# Patient Record
Sex: FEMALE | Race: WHITE | NOT HISPANIC OR LATINO | Employment: FULL TIME | URBAN - METROPOLITAN AREA
[De-identification: names, ages, dates, MRNs, and addresses within clinical notes are randomized per-mention and may not be internally consistent; named-entity substitution may affect disease eponyms.]

---

## 2021-07-23 ENCOUNTER — HISTORICAL (OUTPATIENT)
Dept: ADMINISTRATIVE | Facility: HOSPITAL | Age: 26
End: 2021-07-23

## 2021-07-23 LAB
ABS NEUT (OLG): 5 X10(3)/MCL (ref 2.1–9.2)
ALBUMIN SERPL-MCNC: 4.4 GM/DL (ref 3.4–5)
ALBUMIN/GLOB SERPL: 1.69 {RATIO} (ref 1.5–2.5)
ALP SERPL-CCNC: 46 UNIT/L (ref 38–126)
ALT SERPL-CCNC: 14 UNIT/L (ref 7–52)
APPEARANCE, UA: CLEAR
AST SERPL-CCNC: 15 UNIT/L (ref 15–37)
BACTERIA #/AREA URNS AUTO: ABNORMAL /HPF
BILIRUB SERPL-MCNC: 0.8 MG/DL (ref 0.2–1)
BILIRUB UR QL STRIP: NEGATIVE MG/DL
BILIRUBIN DIRECT+TOT PNL SERPL-MCNC: 0.2 MG/DL (ref 0–0.5)
BILIRUBIN DIRECT+TOT PNL SERPL-MCNC: 0.6 MG/DL
BUN SERPL-MCNC: 11 MG/DL (ref 7–18)
CALCIUM SERPL-MCNC: 9.3 MG/DL (ref 8.5–10)
CHLORIDE SERPL-SCNC: 105 MMOL/L (ref 98–107)
CHOLEST SERPL-MCNC: 125 MG/DL (ref 0–200)
CHOLEST/HDLC SERPL: 2.7 {RATIO}
CO2 SERPL-SCNC: 27 MMOL/L (ref 21–32)
COLOR UR: YELLOW
CREAT SERPL-MCNC: 0.74 MG/DL (ref 0.6–1.3)
ERYTHROCYTE [DISTWIDTH] IN BLOOD BY AUTOMATED COUNT: 11.9 % (ref 11.5–17)
GLOBULIN SER-MCNC: 2.6 GM/DL (ref 1.2–3)
GLUCOSE (UA): NEGATIVE MG/DL
GLUCOSE SERPL-MCNC: 93 MG/DL (ref 74–106)
HCT VFR BLD AUTO: 38.6 % (ref 37–47)
HDLC SERPL-MCNC: 46 MG/DL (ref 35–60)
HGB BLD-MCNC: 12.6 GM/DL (ref 12–16)
HGB UR QL STRIP: ABNORMAL UNIT/L
KETONES UR QL STRIP: NEGATIVE MG/DL
LDLC SERPL CALC-MCNC: 72 MG/DL (ref 0–129)
LEUKOCYTE ESTERASE UR QL STRIP: ABNORMAL UNIT/L
LYMPHOCYTES # BLD AUTO: 2.2 X10(3)/MCL (ref 0.6–3.4)
LYMPHOCYTES NFR BLD AUTO: 28.7 % (ref 13–40)
MCH RBC QN AUTO: 29.8 PG (ref 27–31.2)
MCHC RBC AUTO-ENTMCNC: 33 GM/DL (ref 32–36)
MCV RBC AUTO: 91 FL (ref 80–94)
MONOCYTES # BLD AUTO: 0.5 X10(3)/MCL (ref 0.1–1.3)
MONOCYTES NFR BLD AUTO: 6.6 % (ref 0.1–24)
NEUTROPHILS NFR BLD AUTO: 64.7 % (ref 47–80)
NITRITE UR QL STRIP.AUTO: NEGATIVE
PH UR STRIP: 7.5 [PH]
PLATELET # BLD AUTO: 347 X10(3)/MCL (ref 130–400)
PMV BLD AUTO: 10 FL (ref 9.4–12.4)
POTASSIUM SERPL-SCNC: 4.7 MMOL/L (ref 3.5–5.1)
PROT SERPL-MCNC: 7 GM/DL (ref 6.4–8.2)
PROT UR QL STRIP: NEGATIVE MG/DL
RBC # BLD AUTO: 4.23 X10(6)/MCL (ref 4.2–5.4)
RBC #/AREA URNS HPF: ABNORMAL /HPF
SODIUM SERPL-SCNC: 139 MMOL/L (ref 136–145)
SP GR UR STRIP: 1.02
SQUAMOUS EPITHELIAL, UA: ABNORMAL /LPF
TRIGL SERPL-MCNC: 68 MG/DL (ref 30–150)
TSH SERPL-ACNC: 1.31 MIU/ML (ref 0.35–4.94)
UROBILINOGEN UR STRIP-ACNC: 0.2 MG/DL
VLDLC SERPL CALC-MCNC: 13.6 MG/DL
WBC # SPEC AUTO: 7.7 X10(3)/MCL (ref 4.5–11.5)
WBC #/AREA URNS AUTO: ABNORMAL /[HPF]

## 2021-10-20 ENCOUNTER — HISTORICAL (OUTPATIENT)
Dept: ADMINISTRATIVE | Facility: HOSPITAL | Age: 26
End: 2021-10-20

## 2022-04-11 ENCOUNTER — HISTORICAL (OUTPATIENT)
Dept: ADMINISTRATIVE | Facility: HOSPITAL | Age: 27
End: 2022-04-11

## 2022-04-24 VITALS
DIASTOLIC BLOOD PRESSURE: 84 MMHG | BODY MASS INDEX: 29.9 KG/M2 | SYSTOLIC BLOOD PRESSURE: 132 MMHG | WEIGHT: 186.06 LBS | HEIGHT: 66 IN

## 2023-03-23 ENCOUNTER — LAB VISIT (OUTPATIENT)
Dept: LAB | Facility: HOSPITAL | Age: 28
End: 2023-03-23
Payer: COMMERCIAL

## 2023-03-23 DIAGNOSIS — N96 RECURRENT PREGNANCY LOSS: ICD-10-CM

## 2023-03-23 DIAGNOSIS — N92.6 IRREGULAR PERIODS/MENSTRUAL CYCLES: Primary | ICD-10-CM

## 2023-03-23 LAB — PROGEST SERPL-MCNC: 4.4 NG/ML

## 2023-03-23 PROCEDURE — 36415 COLL VENOUS BLD VENIPUNCTURE: CPT

## 2023-03-23 PROCEDURE — 84144 ASSAY OF PROGESTERONE: CPT

## 2023-03-25 ENCOUNTER — LAB VISIT (OUTPATIENT)
Dept: LAB | Facility: HOSPITAL | Age: 28
End: 2023-03-25
Attending: OBSTETRICS & GYNECOLOGY
Payer: COMMERCIAL

## 2023-03-25 DIAGNOSIS — N92.6 IRREGULAR PERIODS/MENSTRUAL CYCLES: Primary | ICD-10-CM

## 2023-03-25 DIAGNOSIS — N96 RECURRENT PREGNANCY LOSS: ICD-10-CM

## 2023-03-25 PROCEDURE — 36415 COLL VENOUS BLD VENIPUNCTURE: CPT

## 2023-03-25 PROCEDURE — 84144 ASSAY OF PROGESTERONE: CPT | Mod: 90

## 2023-03-27 LAB — PROGEST SERPL IA-MCNC: 9.6 NG/ML

## 2023-03-29 ENCOUNTER — LAB VISIT (OUTPATIENT)
Dept: LAB | Facility: HOSPITAL | Age: 28
End: 2023-03-29
Attending: OBSTETRICS & GYNECOLOGY
Payer: COMMERCIAL

## 2023-03-29 DIAGNOSIS — N96 RECURRENT PREGNANCY LOSS: Primary | ICD-10-CM

## 2023-03-29 LAB — PROGEST SERPL-MCNC: 8 NG/ML

## 2023-03-29 PROCEDURE — 84144 ASSAY OF PROGESTERONE: CPT

## 2023-03-29 PROCEDURE — 36415 COLL VENOUS BLD VENIPUNCTURE: CPT

## 2023-05-02 ENCOUNTER — LAB VISIT (OUTPATIENT)
Dept: LAB | Facility: HOSPITAL | Age: 28
End: 2023-05-02
Attending: OBSTETRICS & GYNECOLOGY
Payer: COMMERCIAL

## 2023-05-02 DIAGNOSIS — N94.10 PAIN IN FEMALE GENITALIA ON INTERCOURSE: Primary | ICD-10-CM

## 2023-05-02 DIAGNOSIS — N80.9 ENDOMETRIOTIC CYST: ICD-10-CM

## 2023-05-02 DIAGNOSIS — N96 HABITUAL ABORTER: ICD-10-CM

## 2023-05-02 LAB
ABORH RETYPE: NORMAL
ANION GAP SERPL CALC-SCNC: 5 MEQ/L
B-HCG FREE SERPL-ACNC: <2.42 MIU/ML
BUN SERPL-MCNC: 12.6 MG/DL (ref 7–18.7)
CALCIUM SERPL-MCNC: 9.5 MG/DL (ref 8.4–10.2)
CHLORIDE SERPL-SCNC: 106 MMOL/L (ref 98–107)
CO2 SERPL-SCNC: 26 MMOL/L (ref 22–29)
CREAT SERPL-MCNC: 0.78 MG/DL (ref 0.55–1.02)
CREAT/UREA NIT SERPL: 16
ERYTHROCYTE [DISTWIDTH] IN BLOOD BY AUTOMATED COUNT: 12.4 % (ref 11.5–17)
GFR SERPLBLD CREATININE-BSD FMLA CKD-EPI: >60 MLS/MIN/1.73/M2
GLUCOSE SERPL-MCNC: 89 MG/DL (ref 74–100)
GROUP & RH: NORMAL
HCT VFR BLD AUTO: 40.5 % (ref 37–47)
HGB BLD-MCNC: 13.1 G/DL (ref 12–16)
INDIRECT COOMBS GEL: NORMAL
MCH RBC QN AUTO: 30 PG (ref 27–31)
MCHC RBC AUTO-ENTMCNC: 32.3 G/DL (ref 33–36)
MCV RBC AUTO: 92.9 FL (ref 80–94)
NRBC BLD AUTO-RTO: 0 %
PLATELET # BLD AUTO: 343 X10(3)/MCL (ref 130–400)
PMV BLD AUTO: 10.5 FL (ref 7.4–10.4)
POTASSIUM SERPL-SCNC: 4.9 MMOL/L (ref 3.5–5.1)
RBC # BLD AUTO: 4.36 X10(6)/MCL (ref 4.2–5.4)
SODIUM SERPL-SCNC: 137 MMOL/L (ref 136–145)
SPECIMEN OUTDATE: NORMAL
WBC # SPEC AUTO: 8.94 X10(3)/MCL (ref 4.5–11.5)

## 2023-05-02 PROCEDURE — 85027 COMPLETE CBC AUTOMATED: CPT

## 2023-05-02 PROCEDURE — 84702 CHORIONIC GONADOTROPIN TEST: CPT

## 2023-05-02 PROCEDURE — 36415 COLL VENOUS BLD VENIPUNCTURE: CPT

## 2023-05-02 PROCEDURE — 86900 BLOOD TYPING SEROLOGIC ABO: CPT | Performed by: OBSTETRICS & GYNECOLOGY

## 2023-05-02 PROCEDURE — 80048 BASIC METABOLIC PNL TOTAL CA: CPT

## 2023-05-02 NOTE — H&P (VIEW-ONLY)
"  Chief Complaint:  pelvic pain, suspected endometriosis, RPL, surgical counseling    History of Present Illness    Donna Mora is a 28 y.o.      Pt has been hopin to conceive since marriage in 2022.  Pt has had 2  miscarriage in this time. ( Last pap 2022 NILM)            reporst 6/10 pelvic pain on CD#1 and #2 and also intermittently has deep pain with IC and associated post-coital aching.         Past Medical History:   Diagnosis Date    Endometriosis, unspecified     GERD (gastroesophageal reflux disease)        Past Surgical History:   Procedure Laterality Date    WISDOM TOOTH EXTRACTION         Family History   Problem Relation Age of Onset    Ovarian cancer Mother     Hyperlipidemia Father     Hypertension Father        Social History     Tobacco Use   Smoking Status Never   Smokeless Tobacco Never       Social History     Substance and Sexual Activity   Sexual Activity Yes    Partners: Male        OB History    Para Term  AB Living   2       2     SAB IAB Ectopic Multiple Live Births   2              # Outcome Date GA Lbr Hany/2nd Weight Sex Delivery Anes PTL Lv   2 SAB 2023 5w0d    SAB   FD   1 2022 6w0d    SAB   FD       Review of Systems  GENERAL: Denies unintentional weight gain or weight loss. Feeling well overall.   SKIN: Denies rash or lesions.   HEENT: Denies headaches, or vision changes.   CARDIOVASCULAR: Denies palpitations or chest pain.   RESPIRATORY: Denies shortness of breath or dyspnea on exertion.  BREASTS: Denies pain, lumps, or nipple discharge.   ABDOMEN: Denies abdominal pain, constipation, diarrhea, nausea, vomiting, change in appetite.  URINARY: Denies frequency, dysuria, hematuria.  NEUROLOGIC: Denies syncope or weakness.   PSYCHIATRIC: Denies depression, anxiety or mood swings.     Objective:     /84   Pulse 93   Ht 5' 5" (1.651 m)   Wt 88.6 kg (195 lb 5.2 oz)   LMP 2023   BMI 32.50 kg/m²     Body mass " index is 32.5 kg/m².    APPEARANCE: Well nourished, well developed, in no acute distress.  PSYCH: Appropriate mood and affect.  SKIN: No acne or hirsutism    NODES: No inguinal, axillary, or supraclavicular lymph node enlargement  CHEST: Normal respiratory effort  CV: Normal cap refill.    ABDOMEN: Soft.  No tenderness or masses.    EXTREMITIES: No edema           Assessment/ Plan:     1. Dysmenorrhea        2. Female dyspareunia        3. Recurrent pregnancy loss        4. Endometriosis            1. To OR for Robot-assisted Laparosocpy and anticipated resection of endometriosis, Dx H-scope D&C this week.    2. After detailed review of surgery and potential risks, consent signed.      Counseling     With the patient I had a full discussion of the risks, benefits, and alternatives of all procedures to be performed, including but not limited to injury to other organs, failure to resolve problem, recurrence of disease state, and infection. We discussed the risks, benefits, and alternatives as well.  She agrees with the plan of care; therefore, we will proceed with the surgery as scheduled.

## 2023-05-03 ENCOUNTER — ANESTHESIA EVENT (OUTPATIENT)
Dept: SURGERY | Facility: HOSPITAL | Age: 28
End: 2023-05-03
Payer: COMMERCIAL

## 2023-05-04 ENCOUNTER — HOSPITAL ENCOUNTER (OUTPATIENT)
Facility: HOSPITAL | Age: 28
Discharge: HOME | End: 2023-05-04
Attending: OBSTETRICS & GYNECOLOGY | Admitting: OBSTETRICS & GYNECOLOGY
Payer: COMMERCIAL

## 2023-05-04 ENCOUNTER — ANESTHESIA (OUTPATIENT)
Dept: SURGERY | Facility: HOSPITAL | Age: 28
End: 2023-05-04
Payer: COMMERCIAL

## 2023-05-04 VITALS
BODY MASS INDEX: 32.15 KG/M2 | DIASTOLIC BLOOD PRESSURE: 75 MMHG | OXYGEN SATURATION: 99 % | RESPIRATION RATE: 18 BRPM | HEART RATE: 90 BPM | TEMPERATURE: 98 F | WEIGHT: 193 LBS | HEIGHT: 65 IN | SYSTOLIC BLOOD PRESSURE: 112 MMHG

## 2023-05-04 DIAGNOSIS — R10.2 FEMALE PELVIC PAIN: Primary | ICD-10-CM

## 2023-05-04 DIAGNOSIS — N94.10 PAIN IN FEMALE GENITALIA ON INTERCOURSE: ICD-10-CM

## 2023-05-04 DIAGNOSIS — N96 HABITUAL ABORTER: ICD-10-CM

## 2023-05-04 DIAGNOSIS — N80.9 ENDOMETRIOTIC CYST: ICD-10-CM

## 2023-05-04 LAB — B-HCG FREE SERPL-ACNC: <2.42 MIU/ML

## 2023-05-04 PROCEDURE — 63600175 PHARM REV CODE 636 W HCPCS: Performed by: ANESTHESIOLOGY

## 2023-05-04 PROCEDURE — 71000015 HC POSTOP RECOV 1ST HR: Performed by: OBSTETRICS & GYNECOLOGY

## 2023-05-04 PROCEDURE — 71000033 HC RECOVERY, INTIAL HOUR: Performed by: OBSTETRICS & GYNECOLOGY

## 2023-05-04 PROCEDURE — 25000003 PHARM REV CODE 250: Performed by: NURSE ANESTHETIST, CERTIFIED REGISTERED

## 2023-05-04 PROCEDURE — D9220A PRA ANESTHESIA: Mod: ANES,,, | Performed by: ANESTHESIOLOGY

## 2023-05-04 PROCEDURE — 37000008 HC ANESTHESIA 1ST 15 MINUTES: Performed by: OBSTETRICS & GYNECOLOGY

## 2023-05-04 PROCEDURE — 63600175 PHARM REV CODE 636 W HCPCS: Performed by: OBSTETRICS & GYNECOLOGY

## 2023-05-04 PROCEDURE — D9220A PRA ANESTHESIA: ICD-10-PCS | Mod: ANES,,, | Performed by: ANESTHESIOLOGY

## 2023-05-04 PROCEDURE — 25000003 PHARM REV CODE 250: Performed by: ANESTHESIOLOGY

## 2023-05-04 PROCEDURE — 84702 CHORIONIC GONADOTROPIN TEST: CPT | Performed by: OBSTETRICS & GYNECOLOGY

## 2023-05-04 PROCEDURE — 63600175 PHARM REV CODE 636 W HCPCS: Performed by: NURSE ANESTHETIST, CERTIFIED REGISTERED

## 2023-05-04 PROCEDURE — 71000016 HC POSTOP RECOV ADDL HR: Performed by: OBSTETRICS & GYNECOLOGY

## 2023-05-04 PROCEDURE — 36000711: Performed by: OBSTETRICS & GYNECOLOGY

## 2023-05-04 PROCEDURE — 25000003 PHARM REV CODE 250: Performed by: OBSTETRICS & GYNECOLOGY

## 2023-05-04 PROCEDURE — 36000710: Performed by: OBSTETRICS & GYNECOLOGY

## 2023-05-04 PROCEDURE — D9220A PRA ANESTHESIA: Mod: CRNA,,, | Performed by: NURSE ANESTHETIST, CERTIFIED REGISTERED

## 2023-05-04 PROCEDURE — D9220A PRA ANESTHESIA: ICD-10-PCS | Mod: CRNA,,, | Performed by: NURSE ANESTHETIST, CERTIFIED REGISTERED

## 2023-05-04 PROCEDURE — 37000009 HC ANESTHESIA EA ADD 15 MINS: Performed by: OBSTETRICS & GYNECOLOGY

## 2023-05-04 PROCEDURE — 27201423 OPTIME MED/SURG SUP & DEVICES STERILE SUPPLY: Performed by: OBSTETRICS & GYNECOLOGY

## 2023-05-04 RX ORDER — HYDROMORPHONE HYDROCHLORIDE 2 MG/ML
INJECTION, SOLUTION INTRAMUSCULAR; INTRAVENOUS; SUBCUTANEOUS
Status: DISCONTINUED | OUTPATIENT
Start: 2023-05-04 | End: 2023-05-04

## 2023-05-04 RX ORDER — PROPOFOL 10 MG/ML
VIAL (ML) INTRAVENOUS
Status: DISCONTINUED | OUTPATIENT
Start: 2023-05-04 | End: 2023-05-04

## 2023-05-04 RX ORDER — ONDANSETRON 2 MG/ML
4 INJECTION INTRAMUSCULAR; INTRAVENOUS DAILY PRN
Status: DISCONTINUED | OUTPATIENT
Start: 2023-05-04 | End: 2023-05-04 | Stop reason: HOSPADM

## 2023-05-04 RX ORDER — ONDANSETRON 4 MG/1
4 TABLET, ORALLY DISINTEGRATING ORAL ONCE
Status: COMPLETED | OUTPATIENT
Start: 2023-05-04 | End: 2023-05-04

## 2023-05-04 RX ORDER — MIDAZOLAM HYDROCHLORIDE 1 MG/ML
2 INJECTION INTRAMUSCULAR; INTRAVENOUS ONCE AS NEEDED
Status: DISCONTINUED | OUTPATIENT
Start: 2023-05-04 | End: 2023-05-04 | Stop reason: HOSPADM

## 2023-05-04 RX ORDER — HYDROMORPHONE HYDROCHLORIDE 2 MG/ML
0.4 INJECTION, SOLUTION INTRAMUSCULAR; INTRAVENOUS; SUBCUTANEOUS EVERY 5 MIN PRN
Status: DISCONTINUED | OUTPATIENT
Start: 2023-05-04 | End: 2023-05-04 | Stop reason: HOSPADM

## 2023-05-04 RX ORDER — HYDROCODONE BITARTRATE AND ACETAMINOPHEN 5; 325 MG/1; MG/1
1 TABLET ORAL EVERY 4 HOURS PRN
Status: DISCONTINUED | OUTPATIENT
Start: 2023-05-04 | End: 2023-05-04 | Stop reason: HOSPADM

## 2023-05-04 RX ORDER — HYDROMORPHONE HYDROCHLORIDE 2 MG/ML
1 INJECTION, SOLUTION INTRAMUSCULAR; INTRAVENOUS; SUBCUTANEOUS EVERY 4 HOURS PRN
Status: DISCONTINUED | OUTPATIENT
Start: 2023-05-04 | End: 2023-05-04 | Stop reason: HOSPADM

## 2023-05-04 RX ORDER — LIDOCAINE HYDROCHLORIDE 10 MG/ML
1 INJECTION, SOLUTION EPIDURAL; INFILTRATION; INTRACAUDAL; PERINEURAL ONCE
Status: DISCONTINUED | OUTPATIENT
Start: 2023-05-04 | End: 2023-05-04 | Stop reason: HOSPADM

## 2023-05-04 RX ORDER — OXYCODONE AND ACETAMINOPHEN 5; 325 MG/1; MG/1
1 TABLET ORAL EVERY 4 HOURS PRN
Qty: 28 TABLET | Refills: 0 | Status: SHIPPED | OUTPATIENT
Start: 2023-05-04

## 2023-05-04 RX ORDER — CEFAZOLIN SODIUM 2 G/50ML
2 SOLUTION INTRAVENOUS
Status: DISCONTINUED | OUTPATIENT
Start: 2023-05-04 | End: 2023-05-04 | Stop reason: HOSPADM

## 2023-05-04 RX ORDER — PROCHLORPERAZINE EDISYLATE 5 MG/ML
5 INJECTION INTRAMUSCULAR; INTRAVENOUS EVERY 6 HOURS PRN
Status: DISCONTINUED | OUTPATIENT
Start: 2023-05-04 | End: 2023-05-04 | Stop reason: HOSPADM

## 2023-05-04 RX ORDER — IBUPROFEN 600 MG/1
600 TABLET ORAL EVERY 6 HOURS PRN
Status: DISCONTINUED | OUTPATIENT
Start: 2023-05-04 | End: 2023-05-04 | Stop reason: HOSPADM

## 2023-05-04 RX ORDER — GLYCOPYRROLATE 0.2 MG/ML
INJECTION INTRAMUSCULAR; INTRAVENOUS
Status: DISCONTINUED | OUTPATIENT
Start: 2023-05-04 | End: 2023-05-04

## 2023-05-04 RX ORDER — ACETAMINOPHEN 325 MG/1
650 TABLET ORAL EVERY 4 HOURS PRN
Status: DISCONTINUED | OUTPATIENT
Start: 2023-05-04 | End: 2023-05-04 | Stop reason: HOSPADM

## 2023-05-04 RX ORDER — ONDANSETRON 4 MG/1
8 TABLET, ORALLY DISINTEGRATING ORAL EVERY 8 HOURS PRN
Status: DISCONTINUED | OUTPATIENT
Start: 2023-05-04 | End: 2023-05-04 | Stop reason: HOSPADM

## 2023-05-04 RX ORDER — DIPHENHYDRAMINE HYDROCHLORIDE 50 MG/ML
25 INJECTION INTRAMUSCULAR; INTRAVENOUS ONCE
Status: DISCONTINUED | OUTPATIENT
Start: 2023-05-04 | End: 2023-05-04 | Stop reason: HOSPADM

## 2023-05-04 RX ORDER — SODIUM CHLORIDE, SODIUM LACTATE, POTASSIUM CHLORIDE, CALCIUM CHLORIDE 600; 310; 30; 20 MG/100ML; MG/100ML; MG/100ML; MG/100ML
INJECTION, SOLUTION INTRAVENOUS CONTINUOUS
Status: DISCONTINUED | OUTPATIENT
Start: 2023-05-04 | End: 2023-05-04 | Stop reason: HOSPADM

## 2023-05-04 RX ORDER — ROCURONIUM BROMIDE 10 MG/ML
INJECTION, SOLUTION INTRAVENOUS
Status: DISCONTINUED | OUTPATIENT
Start: 2023-05-04 | End: 2023-05-04

## 2023-05-04 RX ORDER — MIDAZOLAM HYDROCHLORIDE 1 MG/ML
INJECTION INTRAMUSCULAR; INTRAVENOUS
Status: DISCONTINUED | OUTPATIENT
Start: 2023-05-04 | End: 2023-05-04

## 2023-05-04 RX ORDER — MEPERIDINE HYDROCHLORIDE 25 MG/ML
12.5 INJECTION INTRAMUSCULAR; INTRAVENOUS; SUBCUTANEOUS ONCE
Status: DISCONTINUED | OUTPATIENT
Start: 2023-05-04 | End: 2023-05-04 | Stop reason: HOSPADM

## 2023-05-04 RX ORDER — SODIUM CHLORIDE 9 MG/ML
INJECTION, SOLUTION INTRAVENOUS CONTINUOUS
Status: DISCONTINUED | OUTPATIENT
Start: 2023-05-04 | End: 2023-05-04 | Stop reason: HOSPADM

## 2023-05-04 RX ORDER — SODIUM CHLORIDE, SODIUM GLUCONATE, SODIUM ACETATE, POTASSIUM CHLORIDE AND MAGNESIUM CHLORIDE 30; 37; 368; 526; 502 MG/100ML; MG/100ML; MG/100ML; MG/100ML; MG/100ML
INJECTION, SOLUTION INTRAVENOUS CONTINUOUS
Status: DISCONTINUED | OUTPATIENT
Start: 2023-05-04 | End: 2023-05-04 | Stop reason: HOSPADM

## 2023-05-04 RX ORDER — CEFAZOLIN SODIUM 1 G/3ML
INJECTION, POWDER, FOR SOLUTION INTRAMUSCULAR; INTRAVENOUS
Status: DISCONTINUED | OUTPATIENT
Start: 2023-05-04 | End: 2023-05-04

## 2023-05-04 RX ORDER — FAMOTIDINE 20 MG/1
20 TABLET, FILM COATED ORAL
Status: COMPLETED | OUTPATIENT
Start: 2023-05-04 | End: 2023-05-04

## 2023-05-04 RX ORDER — BUPIVACAINE HYDROCHLORIDE AND EPINEPHRINE 5; 5 MG/ML; UG/ML
INJECTION, SOLUTION EPIDURAL; INTRACAUDAL; PERINEURAL
Status: DISCONTINUED | OUTPATIENT
Start: 2023-05-04 | End: 2023-05-04 | Stop reason: HOSPADM

## 2023-05-04 RX ORDER — DEXAMETHASONE SODIUM PHOSPHATE 4 MG/ML
INJECTION, SOLUTION INTRA-ARTICULAR; INTRALESIONAL; INTRAMUSCULAR; INTRAVENOUS; SOFT TISSUE
Status: DISCONTINUED | OUTPATIENT
Start: 2023-05-04 | End: 2023-05-04

## 2023-05-04 RX ADMIN — ROCURONIUM BROMIDE 50 MG: 10 SOLUTION INTRAVENOUS at 12:05

## 2023-05-04 RX ADMIN — FAMOTIDINE 20 MG: 20 TABLET, FILM COATED ORAL at 09:05

## 2023-05-04 RX ADMIN — ONDANSETRON 4 MG: 4 TABLET, ORALLY DISINTEGRATING ORAL at 09:05

## 2023-05-04 RX ADMIN — PROCHLORPERAZINE EDISYLATE 5 MG: 5 INJECTION INTRAMUSCULAR; INTRAVENOUS at 03:05

## 2023-05-04 RX ADMIN — PROPOFOL 150 MG: 10 INJECTION, EMULSION INTRAVENOUS at 12:05

## 2023-05-04 RX ADMIN — SUGAMMADEX 200 MG: 100 INJECTION, SOLUTION INTRAVENOUS at 02:05

## 2023-05-04 RX ADMIN — CEFAZOLIN 2 G: 330 INJECTION, POWDER, FOR SOLUTION INTRAMUSCULAR; INTRAVENOUS at 12:05

## 2023-05-04 RX ADMIN — ONDANSETRON 4 MG: 2 INJECTION INTRAMUSCULAR; INTRAVENOUS at 02:05

## 2023-05-04 RX ADMIN — DEXAMETHASONE SODIUM PHOSPHATE 12 MG: 4 INJECTION, SOLUTION INTRA-ARTICULAR; INTRALESIONAL; INTRAMUSCULAR; INTRAVENOUS; SOFT TISSUE at 12:05

## 2023-05-04 RX ADMIN — SODIUM CHLORIDE, SODIUM GLUCONATE, SODIUM ACETATE, POTASSIUM CHLORIDE AND MAGNESIUM CHLORIDE: 526; 502; 368; 37; 30 INJECTION, SOLUTION INTRAVENOUS at 12:05

## 2023-05-04 RX ADMIN — HYDROMORPHONE HYDROCHLORIDE 0.5 MG: 2 INJECTION, SOLUTION INTRAMUSCULAR; INTRAVENOUS; SUBCUTANEOUS at 12:05

## 2023-05-04 RX ADMIN — GLYCOPYRROLATE 0.2 MG: 0.2 INJECTION INTRAMUSCULAR; INTRAVENOUS at 12:05

## 2023-05-04 RX ADMIN — SODIUM CHLORIDE, SODIUM GLUCONATE, SODIUM ACETATE, POTASSIUM CHLORIDE AND MAGNESIUM CHLORIDE: 526; 502; 368; 37; 30 INJECTION, SOLUTION INTRAVENOUS at 01:05

## 2023-05-04 RX ADMIN — PROPOFOL 50 MG: 10 INJECTION, EMULSION INTRAVENOUS at 02:05

## 2023-05-04 RX ADMIN — HYDROMORPHONE HYDROCHLORIDE 1 MG: 2 INJECTION, SOLUTION INTRAMUSCULAR; INTRAVENOUS; SUBCUTANEOUS at 12:05

## 2023-05-04 RX ADMIN — MIDAZOLAM HYDROCHLORIDE 2 MG: 1 INJECTION, SOLUTION INTRAMUSCULAR; INTRAVENOUS at 12:05

## 2023-05-04 RX ADMIN — ACETAMINOPHEN 325MG 650 MG: 325 TABLET ORAL at 09:05

## 2023-05-04 RX ADMIN — HYDROMORPHONE HYDROCHLORIDE 0.5 MG: 2 INJECTION, SOLUTION INTRAMUSCULAR; INTRAVENOUS; SUBCUTANEOUS at 02:05

## 2023-05-04 NOTE — DISCHARGE INSTRUCTIONS
-NO driving for 24 hours and while taking narcotic pain medication.    -Keep sites clean and dry for 48 hours. OK to shower afterwards. Do not tub bathe or submerge incision under water.    -Pelvic rest: no heavy lifting, no tampons, no intercourse.    -You did receive suggammadex, please see attached document concerning birthcontrol and suggammadex.    -Monitor sites for infection: redness, swelling, fever, chills, drainage/pus/foul odor.    -Minimal bleeding is expected. Notify your provider if soaking through pad < 1hour.    -Report to your nearest ER/Notify provider if you experience any SUDDEN/SEVERE chest pain, abdominal pain, weakness, trouble breathing, uncontrolled pain/bleeding.

## 2023-05-04 NOTE — TRANSFER OF CARE
"Anesthesia Transfer of Care Note    Patient: Donna Mora    Procedure(s) Performed: Procedure(s) (LRB):  ROBOTIC ABLATION OR EXCISION,ENDOMETRIOSIS (N/A)  DILATION AND CURETTAGE, UTERUS (N/A)  HYSTEROSCOPY    Patient location: PACU    Anesthesia Type: general    Transport from OR: Transported from OR on room air with adequate spontaneous ventilation    Post pain: adequate analgesia    Post assessment: no apparent anesthetic complications    Post vital signs: stable    Level of consciousness: awake and alert    Nausea/Vomiting: no nausea/vomiting    Complications: none    Transfer of care protocol was followed      Last vitals:   Visit Vitals  /80   Pulse 86   Temp 37.3 °C (99.1 °F)   Resp 16   Ht 5' 5" (1.651 m)   Wt 87.5 kg (193 lb)   LMP 04/04/2023   SpO2 99%   Breastfeeding No   BMI 32.12 kg/m²     "

## 2023-05-04 NOTE — ANESTHESIA POSTPROCEDURE EVALUATION
Anesthesia Post Evaluation    Patient: Donna Mora    Procedure(s) Performed: Procedure(s) (LRB):  ROBOTIC ABLATION OR EXCISION,ENDOMETRIOSIS (N/A)  DILATION AND CURETTAGE, UTERUS (N/A)  HYSTEROSCOPY    Final Anesthesia Type: general      Patient location during evaluation: PACU  Patient participation: Yes- Able to Participate  Level of consciousness: awake and alert, awake and oriented  Post-procedure vital signs: reviewed and stable  Pain management: adequate  Airway patency: patent    PONV status at discharge: No PONV  Anesthetic complications: no      Cardiovascular status: blood pressure returned to baseline, hemodynamically stable and stable  Respiratory status: unassisted, spontaneous ventilation and room air  Hydration status: euvolemic  Follow-up not needed.          Vitals Value Taken Time   /78 05/04/23 1502   Temp 36.4 °C (97.6 °F) 05/04/23 1440   Pulse 88 05/04/23 1509   Resp 16 05/04/23 1509   SpO2 98 % 05/04/23 1509   Vitals shown include unvalidated device data.      No case tracking events are documented in the log.      Pain/Bruce Score: Pain Rating Prior to Med Admin: 0 (5/4/2023  9:31 AM)  Bruce Score: 10 (5/4/2023  3:00 PM)

## 2023-05-04 NOTE — ANESTHESIA PREPROCEDURE EVALUATION
05/04/2023  Donna Mora is a 28 y.o., female.    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]    Procedure(s):  ROBOTIC ABLATION OR EXCISION,ENDOMETRIOSIS  DILATION AND CURETTAGE, UTERUS     Review of patient's allergies indicates:  No Known Allergies    Current Outpatient Medications   Medication Instructions    progesterone (PROMETRIUM) 200 MG capsule Place one capsule vaginal nightly on cycle days Peak +3 through Peak +12 each month       KY LAP,FULGURATE/EXCISE LESIONS [85001] (ROBOTIC ABLATION O*    Past Medical History:   Diagnosis Date    Endometriosis, unspecified     GERD (gastroesophageal reflux disease)        Past Surgical History:   Procedure Laterality Date    WISDOM TOOTH EXTRACTION       Lab Results   Component Value Date    WBC 8.94 05/02/2023    HGB 13.1 05/02/2023    HCT 40.5 05/02/2023    MCV 92.9 05/02/2023     05/02/2023   BMP  Lab Results   Component Value Date     05/02/2023    K 4.9 05/02/2023    CO2 26 05/02/2023    BUN 12.6 05/02/2023    CREATININE 0.78 05/02/2023    CALCIUM 9.5 05/02/2023    EGFRNONAA >60 07/23/2021            Pre-op Assessment    I have reviewed the Patient Summary Reports.     I have reviewed the Nursing Notes. I have reviewed the NPO Status.   I have reviewed the Medications.     Review of Systems  Anesthesia Hx:  No problems with previous Anesthesia    Hematology/Oncology:  Hematology Normal   Oncology Normal     EENT/Dental:EENT/Dental Normal   Cardiovascular:  Cardiovascular Normal Exercise tolerance: good   Functional Capacity good / => 4 METS    Pulmonary:  Pulmonary Normal    Renal/:   Denies Chronic Renal Disease.     Hepatic/GI:  Hepatic/GI Normal    Musculoskeletal:  Musculoskeletal Normal    Neurological:  Neurology Normal     Endocrine:  Endocrine Normal  Denies Morbid Obesity / BMI > 40  Dermatological:  Skin Normal    Psych:  Psychiatric Normal           Physical Exam  General: Alert, Oriented, Well nourished and Cooperative    Airway:  Mallampati: II   Mouth Opening: Normal  TM Distance: Normal  Tongue: Normal  Neck ROM: Normal ROM    Dental:  Intact    Chest/Lungs:  Clear to auscultation, Normal Respiratory Rate    Heart:  Rate: Normal  Rhythm: Regular Rhythm          Anesthesia Plan  Type of Anesthesia, risks & benefits discussed:    Anesthesia Type: Gen ETT  Intra-op Monitoring Plan: Standard ASA Monitors  Post Op Pain Control Plan: multimodal analgesia  Induction:  IV and Inhalation  Airway Plan: Direct  Informed Consent: Informed consent signed with the Patient and all parties understand the risks and agree with anesthesia plan.  All questions answered. Patient consented to blood products? Yes  ASA Score: 2  Day of Surgery Review of History & Physical: H&P Update referred to the surgeon/provider.I have interviewed and examined the patient. I have reviewed the patient's H&P dated: There are no significant changes.     Ready For Surgery From Anesthesia Perspective.     .

## 2023-05-04 NOTE — ANESTHESIA PROCEDURE NOTES
Intubation    Date/Time: 5/4/2023 12:29 PM  Performed by: Venessa Whittaker CRNA  Authorized by: Hakan Mueller MD     Intubation:     Induction:  Intravenous    Intubated:  Postinduction    Mask Ventilation:  Easy mask    Attempts:  1    Attempted By:  ENT (ENT resident)    Method of Intubation:  Direct    Blade:  Bernal 2    Laryngeal View Grade: Grade I - full view of cords      Difficult Airway Encountered?: No      Complications:  None    Airway Device:  Oral endotracheal tube    Airway Device Size:  7.0    Style/Cuff Inflation:  Cuffed (inflated to minimal occlusive pressure)    Inflation Amount (mL):  5    Tube secured:  21.5    Secured at:  The lips    Placement Verified By:  Capnometry    Complicating Factors:  None    Findings Post-Intubation:  BS equal bilateral and atraumatic/condition of teeth unchanged

## 2023-05-05 LAB — PSYCHE PATHOLOGY RESULT: NORMAL

## 2023-05-05 NOTE — OP NOTE
[facility] - Periop Services  OBGYN  Operative Note    SUMMARY     Date of Procedure: 5/4/2023     Procedure:   Procedure(s) (LRB):  ROBOTIC ABLATION OR EXCISION,ENDOMETRIOSIS (N/A)  DILATION AND CURETTAGE, UTERUS (N/A)  HYSTEROSCOPY       Surgeon: Miki No M.D.    Assistant: n/a    Pre-Operative Diagnosis:   Suspected endometriosis   2.   Pelvic pain  3.   AUB    Post-Operative Diagnosis:     Stage I endometriosis   2.   Pelvic pain  3.   AUB      Anesthesia: General    Technical Procedures Used:   1. Robot-assist laparoscopic excision of endometriosis  2. Diagnostic hysteroscopy with dilation and curretage    Complications: none    Estimated Blood Loss (EBL): 50ml       Indications: Pelvic pain c/w endometriosis, AUB    Findings:  Diagnostic laparoscopy revealed adhesions involving the sigmoid colon and left abdominal wall.  Additionally there were suspected endometriosis lesions involving the left uterosacral ligament, the left ovarian fossae, the posterior cul-de-sac, and the surface of each ovary.    Upon laparosocpy, a small posterior fundal defect from suspected perforation with uterine sound was noted and observed to be hemostatic.  Hysteroscopy evaluation was also consistent with a false track inferior to the endocervical canal.  After redirecting the hysteroscope into the uterine cavity, bilateral tubal ostia were seen.  No endometrial polyps or submucous fibroids were appreciated.    Other than a right paratubal cyst, the Fallopian tubes, uterine body, and liver were all normal in appearance.     Specimens:  1. left uterosacral ligament  2. left ovarian fossa  3. Right tubal cyst  4. Posterior cul-de-sac  5. Endometrial curretings         Procedure in Detail:             After informed consent and negative hCG was verified patient was taken to the operating room with IV fluid running. She was then administered general endotracheal anesthesia, and prepped and draped in low lithotomy  position using Lauro stirrups. The patient's arms were tucked at her sides. A Macias catheter was placed into the bladder. After the uterus was sounded, the 6cm KIRAN tip was assembled which was then placed into the uterine cavity with the assistance of a weighted speculum and tenaculum. Attention was then turned towards the abdomen where the base of the umbilicus was anesthetized with half percent Marcaine with epinephrine.   An 8 mm vertical skin incision was made with scalpel at the base of the umbilicus.  A Varese needle and placed in the intraperitoneal cavity. Intraperitoneal placement confirmed by instillation of normal saline and ball test.  The peritoneum was then obtained with CO2 opening pressure that was noted to be approximately 7 mmHg. Pressure was initially set at 15 mm of mercury until all 3 trocars were placed. After placement of all trocars the pressure was reduced to 12mmHg for the remainder of the case.  A robotic trocar was placed in the right lower quadrant through an anesthetized 8 mm skin incision at a point approximately  8 cm lateral in a 10° caudad direction from the umbilical incision.  An additional accessory trocar was then placed in the left lower quadrant immediately symmetrical to the right lower quadrant port also an anesthetized 8 mm skin incision under laparoscopic visualization.     At this point the patient was placed in steep Trendelenburg. The bowel was swept out of the posterior cul-de-sac to the degree possible with adhesions present. Next Trendelenburg was reduced to approximately 20°. At this time the robot was docked in the following fashion. The 8.5 mm camera was docked at the umbilical port. The right lower quadrant port was assembled with the monopolar scissors using the arm #4. The left lower quadrant port degrees was assembled with the  bipolar forceps using arm #2. At this point the surgeon broke scrub and was seated at the console. Diagnostic laparoscopy was then  performed with findings as described above.     First, lysis of adhesions was performed using monopolar scissors.   Next the all of the suspected areas of endometriosis described in the above 'specimens' section were excised with a combination of both sharp cutting and electrocautery with cut current. Care was taken to observe the course of both ureters prior and during peritoneal resections.  Areas of superficial endometriosis on the surface of each ovary and other  were ablated with electrocautery cut current.        At this point the robot was undocked and the surgeon re-scrubbed.     After peritoneal surfaces were resected, Hemoblast was applied to further ensure hemostasis Next, a Seprafilm solution was applied through the laparoscope After hemostasis was confirmed the pelvis was irrigated with warm lactated Ringer's solution.   The patient was taken out of Trendelenburg. Pelvis was again inspected and noted to be hemostatic. Approximately 900mL of warm lactated Ringer's was left in the pelvic cavity the close of the case to both minimize postoperative adhesions and pain. Pneumoperitoneum was then evacuated. All trocars were removed from the abdomen. The skin incisions for all 3 trocar sites were then closed with 4-0 Monocryl in subcuticular fashion. The skin incisions were then sealed with Dermabond. The KIRAN was then removed from the vagina.     Weighted speculum placed in the posterior vagina and the anterior vagina was elevated with alvaro retractor. The anterior lip of the cervix was grasped with the single tooth tenaculum. The uterus was then sounded and measured 10 cm in length. The dilators were used to dilate the cervix enough to allow introduction of the hysteroscope. The hysteroscope was introduced under direct visualization with care taken to avoid the false tract and suspected minor perforation described above in the findings section.  The uterine cavity was noted to be free of polyps or other  anomalies. Bilateral tubal ostia were visualized.    Next a sharp curettage was performed to remove the endometrium and any remaining fragments.    The Macias catheter was removed at the end of the case.   All instruments were then removed from the vagina. The patient tolerated procedure well. Sponge lap and needle counts were correct x 2.Instrument sponge and needle counts were correct x2. The patient was extubated and returned to the recovery room awake and in stable condition.           Disposition: stable, to home after meeting discharge criteria today.

## 2023-07-03 ENCOUNTER — APPOINTMENT (OUTPATIENT)
Dept: LAB | Facility: HOSPITAL | Age: 28
End: 2023-07-03
Attending: OBSTETRICS & GYNECOLOGY
Payer: COMMERCIAL

## 2023-07-03 DIAGNOSIS — O26.21 HISTORY OF RECURRENT ABORTION, CURRENTLY PREGNANT IN FIRST TRIMESTER: Primary | ICD-10-CM

## 2023-07-03 LAB
B-HCG FREE SERPL-ACNC: 30.91 MIU/ML
PROGEST SERPL-MCNC: 9.2 NG/ML

## 2023-07-03 PROCEDURE — 84702 CHORIONIC GONADOTROPIN TEST: CPT

## 2023-07-03 PROCEDURE — 36415 COLL VENOUS BLD VENIPUNCTURE: CPT

## 2023-07-03 PROCEDURE — 84144 ASSAY OF PROGESTERONE: CPT

## 2023-07-05 ENCOUNTER — LAB VISIT (OUTPATIENT)
Dept: LAB | Facility: HOSPITAL | Age: 28
End: 2023-07-05
Attending: OBSTETRICS & GYNECOLOGY
Payer: COMMERCIAL

## 2023-07-05 DIAGNOSIS — O26.21 HISTORY OF RECURRENT ABORTION, CURRENTLY PREGNANT IN FIRST TRIMESTER: Primary | ICD-10-CM

## 2023-07-05 LAB — B-HCG FREE SERPL-ACNC: 41.71 MIU/ML

## 2023-07-05 PROCEDURE — 84702 CHORIONIC GONADOTROPIN TEST: CPT

## 2023-07-05 PROCEDURE — 36415 COLL VENOUS BLD VENIPUNCTURE: CPT

## 2023-07-07 ENCOUNTER — LAB VISIT (OUTPATIENT)
Dept: LAB | Facility: HOSPITAL | Age: 28
End: 2023-07-07
Attending: OBSTETRICS & GYNECOLOGY
Payer: COMMERCIAL

## 2023-07-07 DIAGNOSIS — O26.21 HISTORY OF RECURRENT ABORTION, CURRENTLY PREGNANT IN FIRST TRIMESTER: Primary | ICD-10-CM

## 2023-07-07 LAB — B-HCG FREE SERPL-ACNC: 27.15 MIU/ML

## 2023-07-07 PROCEDURE — 36415 COLL VENOUS BLD VENIPUNCTURE: CPT

## 2023-07-07 PROCEDURE — 84702 CHORIONIC GONADOTROPIN TEST: CPT

## 2023-07-14 ENCOUNTER — LAB VISIT (OUTPATIENT)
Dept: LAB | Facility: HOSPITAL | Age: 28
End: 2023-07-14
Attending: OBSTETRICS & GYNECOLOGY
Payer: COMMERCIAL

## 2023-07-14 DIAGNOSIS — O03.9 ABORTION, SPONTANEOUS: Primary | ICD-10-CM

## 2023-07-14 LAB — B-HCG FREE SERPL-ACNC: 18.63 MIU/ML

## 2023-07-14 PROCEDURE — 36415 COLL VENOUS BLD VENIPUNCTURE: CPT

## 2023-07-14 PROCEDURE — 84702 CHORIONIC GONADOTROPIN TEST: CPT

## 2023-07-17 NOTE — DISCHARGE SUMMARY
[facility] Services  Discharge Summary  OBGYN    Admission date: 5/4/2023  Discharge date: 5/4/2023    Admit Dx:    There is no problem list on file for this patient.    Discharge Dx:  There is no problem list on file for this patient.      Attending Physician: Miki No   Discharge Provider: Miki No    Procedures Performed: Procedure(s) (LRB):  ROBOTIC ABLATION OR EXCISION,ENDOMETRIOSIS (N/A)  DILATION AND CURETTAGE, UTERUS (N/A)  HYSTEROSCOPY    Hospital Course: Patient was admitted on 5/4/2023 .  Procedure was uncomplicated, for full details see operative report. Barring any unforseen incidents, patient will be discharged home when she is able to ambulate, void, and tolerate PO intake.    Consults: none      Discharged Condition: stable    Disposition: Home    Follow Up:   2 weeks in office.

## 2023-07-31 ENCOUNTER — LAB VISIT (OUTPATIENT)
Dept: LAB | Facility: HOSPITAL | Age: 28
End: 2023-07-31
Attending: OBSTETRICS & GYNECOLOGY
Payer: COMMERCIAL

## 2023-07-31 DIAGNOSIS — O03.9 ABORTION, SPONTANEOUS: Primary | ICD-10-CM

## 2023-07-31 LAB — B-HCG FREE SERPL-ACNC: <2.42 MIU/ML

## 2023-07-31 PROCEDURE — 36415 COLL VENOUS BLD VENIPUNCTURE: CPT

## 2023-07-31 PROCEDURE — 84702 CHORIONIC GONADOTROPIN TEST: CPT

## 2023-10-17 ENCOUNTER — LAB VISIT (OUTPATIENT)
Dept: LAB | Facility: HOSPITAL | Age: 28
End: 2023-10-17
Attending: OBSTETRICS & GYNECOLOGY
Payer: COMMERCIAL

## 2023-10-17 DIAGNOSIS — O26.21 HISTORY OF RECURRENT ABORTION, CURRENTLY PREGNANT IN FIRST TRIMESTER: Primary | ICD-10-CM

## 2023-10-17 LAB — B-HCG FREE SERPL-ACNC: 23.59 MIU/ML

## 2023-10-17 PROCEDURE — 84702 CHORIONIC GONADOTROPIN TEST: CPT

## 2023-10-17 PROCEDURE — 36415 COLL VENOUS BLD VENIPUNCTURE: CPT

## 2023-10-19 ENCOUNTER — LAB VISIT (OUTPATIENT)
Dept: LAB | Facility: HOSPITAL | Age: 28
End: 2023-10-19
Attending: OBSTETRICS & GYNECOLOGY
Payer: COMMERCIAL

## 2023-10-19 DIAGNOSIS — D72.829 LEUKOCYTOSIS, UNSPECIFIED TYPE: ICD-10-CM

## 2023-10-19 DIAGNOSIS — O26.21 HISTORY OF RECURRENT ABORTION, CURRENTLY PREGNANT IN FIRST TRIMESTER: Primary | ICD-10-CM

## 2023-10-19 LAB
B-HCG FREE SERPL-ACNC: 33.92 MIU/ML
BASOPHILS # BLD AUTO: 0.07 X10(3)/MCL
BASOPHILS NFR BLD AUTO: 0.6 %
EOSINOPHIL # BLD AUTO: 0.15 X10(3)/MCL (ref 0–0.9)
EOSINOPHIL NFR BLD AUTO: 1.4 %
ERYTHROCYTE [DISTWIDTH] IN BLOOD BY AUTOMATED COUNT: 12.3 % (ref 11.5–17)
HCT VFR BLD AUTO: 40.3 % (ref 37–47)
HGB BLD-MCNC: 13.1 G/DL (ref 12–16)
IMM GRANULOCYTES # BLD AUTO: 0.02 X10(3)/MCL (ref 0–0.04)
IMM GRANULOCYTES NFR BLD AUTO: 0.2 %
LYMPHOCYTES # BLD AUTO: 2.73 X10(3)/MCL (ref 0.6–4.6)
LYMPHOCYTES NFR BLD AUTO: 25.2 %
MCH RBC QN AUTO: 30 PG (ref 27–31)
MCHC RBC AUTO-ENTMCNC: 32.5 G/DL (ref 33–36)
MCV RBC AUTO: 92.2 FL (ref 80–94)
MONOCYTES # BLD AUTO: 0.51 X10(3)/MCL (ref 0.1–1.3)
MONOCYTES NFR BLD AUTO: 4.7 %
NEUTROPHILS # BLD AUTO: 7.37 X10(3)/MCL (ref 2.1–9.2)
NEUTROPHILS NFR BLD AUTO: 67.9 %
NRBC BLD AUTO-RTO: 0 %
PLATELET # BLD AUTO: 355 X10(3)/MCL (ref 130–400)
PMV BLD AUTO: 10.5 FL (ref 7.4–10.4)
PROGEST SERPL-MCNC: 1.3 NG/ML
RBC # BLD AUTO: 4.37 X10(6)/MCL (ref 4.2–5.4)
WBC # SPEC AUTO: 10.85 X10(3)/MCL (ref 4.5–11.5)

## 2023-10-19 PROCEDURE — 85025 COMPLETE CBC W/AUTO DIFF WBC: CPT

## 2023-10-19 PROCEDURE — 36415 COLL VENOUS BLD VENIPUNCTURE: CPT

## 2023-10-19 PROCEDURE — 84144 ASSAY OF PROGESTERONE: CPT

## 2023-10-19 PROCEDURE — 84702 CHORIONIC GONADOTROPIN TEST: CPT

## 2023-10-23 ENCOUNTER — LAB VISIT (OUTPATIENT)
Dept: LAB | Facility: HOSPITAL | Age: 28
End: 2023-10-23
Attending: OBSTETRICS & GYNECOLOGY
Payer: COMMERCIAL

## 2023-10-23 DIAGNOSIS — O26.22 HISTORY OF RECURRENT ABORTION, CURRENTLY PREGNANT IN SECOND TRIMESTER: Primary | ICD-10-CM

## 2023-10-23 LAB — B-HCG FREE SERPL-ACNC: 34 MIU/ML

## 2023-10-23 PROCEDURE — 36415 COLL VENOUS BLD VENIPUNCTURE: CPT

## 2023-10-23 PROCEDURE — 84702 CHORIONIC GONADOTROPIN TEST: CPT

## 2023-11-15 ENCOUNTER — LAB VISIT (OUTPATIENT)
Dept: LAB | Facility: HOSPITAL | Age: 28
End: 2023-11-15
Attending: OBSTETRICS & GYNECOLOGY
Payer: COMMERCIAL

## 2023-11-15 DIAGNOSIS — O03.9 THREATENED ABORTION, DELIVERED: Primary | ICD-10-CM

## 2023-11-15 LAB — B-HCG FREE SERPL-ACNC: <2.42 MIU/ML

## 2023-11-15 PROCEDURE — 36415 COLL VENOUS BLD VENIPUNCTURE: CPT

## 2023-11-15 PROCEDURE — 84702 CHORIONIC GONADOTROPIN TEST: CPT

## 2025-04-07 ENCOUNTER — LAB VISIT (OUTPATIENT)
Dept: LAB | Facility: HOSPITAL | Age: 30
End: 2025-04-07
Attending: OBSTETRICS & GYNECOLOGY
Payer: COMMERCIAL

## 2025-04-07 DIAGNOSIS — Z34.81 PRENATAL CARE, SUBSEQUENT PREGNANCY, FIRST TRIMESTER: Primary | ICD-10-CM

## 2025-04-07 LAB — B-HCG FREE SERPL-ACNC: 65.89 MIU/ML

## 2025-04-07 PROCEDURE — 36415 COLL VENOUS BLD VENIPUNCTURE: CPT

## 2025-04-07 PROCEDURE — 84144 ASSAY OF PROGESTERONE: CPT

## 2025-04-07 PROCEDURE — 84702 CHORIONIC GONADOTROPIN TEST: CPT

## 2025-04-09 ENCOUNTER — LAB VISIT (OUTPATIENT)
Dept: LAB | Facility: HOSPITAL | Age: 30
End: 2025-04-09
Attending: OBSTETRICS & GYNECOLOGY
Payer: COMMERCIAL

## 2025-04-09 DIAGNOSIS — Z34.81 PRENATAL CARE, SUBSEQUENT PREGNANCY, FIRST TRIMESTER: Primary | ICD-10-CM

## 2025-04-09 LAB
B-HCG FREE SERPL-ACNC: 208.33 MIU/ML
PROGEST SERPL IA-MCNC: 33 NG/ML

## 2025-04-09 PROCEDURE — 84702 CHORIONIC GONADOTROPIN TEST: CPT

## 2025-04-09 PROCEDURE — 36415 COLL VENOUS BLD VENIPUNCTURE: CPT

## 2025-05-06 ENCOUNTER — LAB VISIT (OUTPATIENT)
Dept: LAB | Facility: HOSPITAL | Age: 30
End: 2025-05-06
Attending: OBSTETRICS & GYNECOLOGY
Payer: COMMERCIAL

## 2025-05-06 DIAGNOSIS — Z34.01 ENCOUNTER FOR SUPERVISION OF NORMAL FIRST PREGNANCY IN FIRST TRIMESTER: Primary | ICD-10-CM

## 2025-05-06 LAB
C TRACH DNA SPEC QL NAA+PROBE: NOT DETECTED
ERYTHROCYTE [DISTWIDTH] IN BLOOD BY AUTOMATED COUNT: 12.1 % (ref 11.5–17)
GROUP & RH: NORMAL
HBV SURFACE AG SERPL QL IA: NONREACTIVE
HCT VFR BLD AUTO: 35.3 % (ref 37–47)
HCV AB SERPL QL IA: NONREACTIVE
HGB BLD-MCNC: 11.3 G/DL (ref 12–16)
INDIRECT COOMBS: NORMAL
MCH RBC QN AUTO: 29.1 PG (ref 27–31)
MCHC RBC AUTO-ENTMCNC: 32 G/DL (ref 33–36)
MCV RBC AUTO: 91 FL (ref 80–94)
N GONORRHOEA DNA SPEC QL NAA+PROBE: NOT DETECTED
NRBC BLD AUTO-RTO: 0 %
PLATELET # BLD AUTO: 317 X10(3)/MCL (ref 130–400)
PMV BLD AUTO: 10.8 FL (ref 7.4–10.4)
PROGEST SERPL-MCNC: 26.3 NG/ML
RBC # BLD AUTO: 3.88 X10(6)/MCL (ref 4.2–5.4)
SPECIMEN OUTDATE: NORMAL
SPECIMEN SOURCE: NORMAL
T PALLIDUM AB SER QL: NONREACTIVE
WBC # BLD AUTO: 9.69 X10(3)/MCL (ref 4.5–11.5)

## 2025-05-06 PROCEDURE — 86762 RUBELLA ANTIBODY: CPT

## 2025-05-06 PROCEDURE — 86803 HEPATITIS C AB TEST: CPT

## 2025-05-06 PROCEDURE — 84144 ASSAY OF PROGESTERONE: CPT

## 2025-05-06 PROCEDURE — 86780 TREPONEMA PALLIDUM: CPT

## 2025-05-06 PROCEDURE — 87389 HIV-1 AG W/HIV-1&-2 AB AG IA: CPT

## 2025-05-06 PROCEDURE — 36415 COLL VENOUS BLD VENIPUNCTURE: CPT

## 2025-05-06 PROCEDURE — 87591 N.GONORRHOEAE DNA AMP PROB: CPT

## 2025-05-06 PROCEDURE — 87086 URINE CULTURE/COLONY COUNT: CPT

## 2025-05-06 PROCEDURE — 86850 RBC ANTIBODY SCREEN: CPT | Performed by: OBSTETRICS & GYNECOLOGY

## 2025-05-06 PROCEDURE — 85027 COMPLETE CBC AUTOMATED: CPT

## 2025-05-06 PROCEDURE — 87340 HEPATITIS B SURFACE AG IA: CPT

## 2025-05-07 LAB — HIV 1+2 AB+HIV1 P24 AG SERPL QL IA: NONREACTIVE

## 2025-05-08 LAB
BACTERIA UR CULT: NORMAL
RUBV IGG SERPL IA-ACNC: 2
RUBV IGG SERPL QL IA: POSITIVE

## (undated) DEVICE — JELLY SURGILUBE LUBE TUBE 2OZ

## (undated) DEVICE — PAD SANITARY HVY ABSRB UNSCNTD

## (undated) DEVICE — IRRIGATOR SUCTION W/TIP

## (undated) DEVICE — SOL LAC RINGERS 1000ML INJ

## (undated) DEVICE — TRAY CATH FOL SIL URIMTR 16FR

## (undated) DEVICE — SYR DISP LL 5CC

## (undated) DEVICE — BELLOW CANN HEMOBLAST 1.65GR

## (undated) DEVICE — TUBE AQUILEX OUTFLOW

## (undated) DEVICE — LINER SUC LID CANSTR 1500ML

## (undated) DEVICE — PAD PINK TRENDELENBURG POS XL

## (undated) DEVICE — TUBE AQUILEX INFLOW

## (undated) DEVICE — KIT SURGICAL TURNOVER

## (undated) DEVICE — SYR 50ML CATH TIP

## (undated) DEVICE — SUT MCRYL PLUS 4-0 PS2 27IN

## (undated) DEVICE — OBTURATOR BLADELESS 8MM XI CLR

## (undated) DEVICE — COVER MAYO STAND REINFRCD 30

## (undated) DEVICE — NDL HYPO REG 25G X 1 1/2

## (undated) DEVICE — COVER TIP CURVED SCISSORS XI

## (undated) DEVICE — DRAPE ARM DAVINCI XI

## (undated) DEVICE — ELECTRODE PATIENT RETURN DISP

## (undated) DEVICE — TIP RUMI WHITE DISP UMW676

## (undated) DEVICE — COVER TABLE HVY DTY 60X90IN

## (undated) DEVICE — CATH RED RUBBER LATEX 14F 16IN

## (undated) DEVICE — TRAY SKIN SCRUB WET PREMIUM

## (undated) DEVICE — KIT GYN LAPAROSCOPY LAFAYETTE

## (undated) DEVICE — GLOVE PROTEXIS BLUE LATEX 8

## (undated) DEVICE — SYR 10CC LUER LOCK

## (undated) DEVICE — ADHESIVE DERMABOND ADVANCED

## (undated) DEVICE — CHLORAPREP W TINT 26ML APPL

## (undated) DEVICE — SOL CLEARIFY VISUALIZATION LAP

## (undated) DEVICE — KIT LITHOTOMY MINOR LAFAYETTE

## (undated) DEVICE — LEGGING SURG CONV 43X28IN

## (undated) DEVICE — APPLICATOR HEMOBLAST LAPSCP

## (undated) DEVICE — Device

## (undated) DEVICE — GLOVE PROTEXIS LTX MICRO  7.5

## (undated) DEVICE — SEAL UNIVERSAL 5MM-8MM XI

## (undated) DEVICE — DRAPE COLUMN DAVINCI XI

## (undated) DEVICE — VACURETTE 8MM CURVED

## (undated) DEVICE — SPONGE LAP STRL 18X18IN

## (undated) DEVICE — PAD PREP 50/CA